# Patient Record
Sex: FEMALE | Race: WHITE | NOT HISPANIC OR LATINO
[De-identification: names, ages, dates, MRNs, and addresses within clinical notes are randomized per-mention and may not be internally consistent; named-entity substitution may affect disease eponyms.]

---

## 2023-04-13 ENCOUNTER — APPOINTMENT (OUTPATIENT)
Dept: SURGERY | Facility: CLINIC | Age: 33
End: 2023-04-13
Payer: COMMERCIAL

## 2023-04-13 VITALS
HEIGHT: 64 IN | SYSTOLIC BLOOD PRESSURE: 100 MMHG | DIASTOLIC BLOOD PRESSURE: 80 MMHG | TEMPERATURE: 97 F | BODY MASS INDEX: 28.68 KG/M2 | WEIGHT: 168 LBS

## 2023-04-13 DIAGNOSIS — Z87.442 PERSONAL HISTORY OF URINARY CALCULI: ICD-10-CM

## 2023-04-13 DIAGNOSIS — N64.52 NIPPLE DISCHARGE: ICD-10-CM

## 2023-04-13 DIAGNOSIS — Z80.9 FAMILY HISTORY OF MALIGNANT NEOPLASM, UNSPECIFIED: ICD-10-CM

## 2023-04-13 DIAGNOSIS — Z80.7 FAMILY HISTORY OF OTHER MALIGNANT NEOPLASMS OF LYMPHOID, HEMATOPOIETIC AND RELATED TISSUES: ICD-10-CM

## 2023-04-13 DIAGNOSIS — E07.9 DISORDER OF THYROID, UNSPECIFIED: ICD-10-CM

## 2023-04-13 PROBLEM — Z00.00 ENCOUNTER FOR PREVENTIVE HEALTH EXAMINATION: Status: ACTIVE | Noted: 2023-04-13

## 2023-04-13 PROCEDURE — 99202 OFFICE O/P NEW SF 15 MIN: CPT

## 2023-04-14 PROBLEM — E07.9 THYROID DISORDER: Status: ACTIVE | Noted: 2023-04-13

## 2023-04-14 PROBLEM — Z87.442 HISTORY OF KIDNEY STONES: Status: RESOLVED | Noted: 2023-04-13 | Resolved: 2023-04-14

## 2023-04-14 PROBLEM — N64.52 BLOODY DISCHARGE FROM LEFT NIPPLE: Status: ACTIVE | Noted: 2023-04-14

## 2023-04-14 PROBLEM — Z80.7 FAMILY HISTORY OF LYMPHOMA: Status: ACTIVE | Noted: 2023-04-13

## 2023-04-14 PROBLEM — Z80.9 FAMILY HISTORY OF MALIGNANT NEOPLASM: Status: ACTIVE | Noted: 2023-04-13

## 2023-04-14 NOTE — ASSESSMENT
[FreeTextEntry1] :   The bloody discharge from the left nipple is likely benign in appearance and will likely be self-limited.  This may be due to the excoriation as noted above, or possibly due to some transient terminal duct hypervascularity at the onset of lactation.  No breast imaging studies are thought to be necessary at this time and I do not believe this is infectious in origin.  Cytology and cultures of the left nipple discharge are not thought to be necessary and the patient was reassured.  Local care measures were discussed regarding the care of the nipples and  she will be observed for now.    It was also discussed that there is no consequence for her child should she ingest some of the blood while nursing.  If she has no further bloody discharge, she will return here in about 6 to 8 weeks for reexam or sooner as needed.  We also discussed the nature of the supernumerary nipple and that there is no further work-up or intervention needed for this either.  We had a long discussion in this regard and all questions were answered, and she is happy with the assessment and plan.

## 2023-04-14 NOTE — HISTORY OF PRESENT ILLNESS
[de-identified] :  the patient is referred by her gynecologist for a bloody discharge from the left nipple.  She is now 6 days  from her second pregnancy and has been nursing and pumping her breasts also.  She has bilateral breast pain as expected but yesterday had 2 episodes of a small amount of blood mixed in with the milk and none since.  She has some excoriation of the left nipple also but denies any other symptoms or changes in either breast.    She has had no fevers or chills, or axillary pain either.  She nursed for 8 months with her first child and had no bleeding at that time, she did have 2 episodes of mastitis which were treated with antibiotics only.  She has no prior history of any other breast disorders or breast surgery and she has no family history of breast cancer.\par \par Last GYN examination was last year, menarche was at age 15 and first pregnancy was at age 28.  She is a  who never used hormones.

## 2023-04-14 NOTE — PHYSICAL EXAM
[Normal Thyroid] : the thyroid was normal [Normal Breath Sounds] : Normal breath sounds [Normal Heart Sounds] : normal heart sounds [Normal Rate and Rhythm] : normal rate and rhythm [de-identified] :  healthy [de-identified] :  no adenopathy [de-identified] :  moderate to large in size and symmetrical, diffusely full and glandular in  consistency.    She has bilateral milky discharge from both nipples, neither of which is bloody and some mild excoriation and cracking of the surface of the left nipple.  No discrete masses or suspicious areas are palpable in either breast.  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  No axillary adenopathy or tenderness bilaterally.

## 2023-06-01 ENCOUNTER — APPOINTMENT (OUTPATIENT)
Dept: SURGERY | Facility: CLINIC | Age: 33
End: 2023-06-01